# Patient Record
Sex: FEMALE | URBAN - NONMETROPOLITAN AREA
[De-identification: names, ages, dates, MRNs, and addresses within clinical notes are randomized per-mention and may not be internally consistent; named-entity substitution may affect disease eponyms.]

---

## 2023-06-21 ENCOUNTER — APPOINTMENT (RX ONLY)
Dept: URBAN - NONMETROPOLITAN AREA CLINIC 16 | Facility: CLINIC | Age: 73
Setting detail: DERMATOLOGY
End: 2023-06-21

## 2023-06-21 VITALS — WEIGHT: 200 LBS | HEIGHT: 63.5 IN

## 2023-06-21 DIAGNOSIS — L72.0 EPIDERMAL CYST: ICD-10-CM

## 2023-06-21 DIAGNOSIS — L81.4 OTHER MELANIN HYPERPIGMENTATION: ICD-10-CM

## 2023-06-21 DIAGNOSIS — L30.9 DERMATITIS, UNSPECIFIED: ICD-10-CM

## 2023-06-21 DIAGNOSIS — I78.8 OTHER DISEASES OF CAPILLARIES: ICD-10-CM

## 2023-06-21 DIAGNOSIS — L57.8 OTHER SKIN CHANGES DUE TO CHRONIC EXPOSURE TO NONIONIZING RADIATION: ICD-10-CM

## 2023-06-21 PROCEDURE — ? FULL BODY SKIN EXAM - DECLINED

## 2023-06-21 PROCEDURE — ? TREATMENT REGIMEN

## 2023-06-21 PROCEDURE — 99203 OFFICE O/P NEW LOW 30 MIN: CPT

## 2023-06-21 PROCEDURE — ? COUNSELING

## 2023-06-21 PROCEDURE — ? PRESCRIPTION

## 2023-06-21 RX ORDER — TRIAMCINOLONE ACETONIDE 1 MG/G
CREAM TOPICAL
Qty: 80 | Refills: 3 | Status: ERX | COMMUNITY
Start: 2023-06-21

## 2023-06-21 RX ORDER — TRETIONIN 0.5 MG/G
CREAM TOPICAL
Qty: 45 | Refills: 3 | Status: ERX | COMMUNITY
Start: 2023-06-21

## 2023-06-21 RX ADMIN — TRETIONIN: 0.5 CREAM TOPICAL at 00:00

## 2023-06-21 RX ADMIN — TRIAMCINOLONE ACETONIDE: 1 CREAM TOPICAL at 00:00

## 2023-06-21 ASSESSMENT — LOCATION DETAILED DESCRIPTION DERM
LOCATION DETAILED: LEFT CHIN
LOCATION DETAILED: LEFT MEDIAL MALAR CHEEK
LOCATION DETAILED: RIGHT MEDIAL FOREHEAD
LOCATION DETAILED: RIGHT PROXIMAL PRETIBIAL REGION

## 2023-06-21 ASSESSMENT — LOCATION SIMPLE DESCRIPTION DERM
LOCATION SIMPLE: RIGHT PRETIBIAL REGION
LOCATION SIMPLE: CHIN
LOCATION SIMPLE: LEFT CHEEK
LOCATION SIMPLE: RIGHT FOREHEAD

## 2023-06-21 ASSESSMENT — LOCATION ZONE DERM
LOCATION ZONE: FACE
LOCATION ZONE: LEG

## 2023-06-21 NOTE — PROCEDURE: TREATMENT REGIMEN
Initiate Treatment: Retinol 0.05 to face qod x 2 weeks then qd
Detail Level: Zone
Initiate Treatment: Triamcinolone bid x 2 weeks then qod prn
Plan: Pt states that this has been present for a couple of years. She states that it is very pruritic. She has trialed otc hydrocortisone with minimal improvement. \\n\\nWE did discuss a bx if area fails to improve with triamcinolone cream BID x 2 weeks use.